# Patient Record
Sex: FEMALE | Race: WHITE
[De-identification: names, ages, dates, MRNs, and addresses within clinical notes are randomized per-mention and may not be internally consistent; named-entity substitution may affect disease eponyms.]

---

## 2022-07-27 ENCOUNTER — HOSPITAL ENCOUNTER (EMERGENCY)
Dept: HOSPITAL 47 - EC | Age: 27
Discharge: HOME | End: 2022-07-27
Payer: COMMERCIAL

## 2022-07-27 VITALS
TEMPERATURE: 98.5 F | SYSTOLIC BLOOD PRESSURE: 118 MMHG | DIASTOLIC BLOOD PRESSURE: 76 MMHG | RESPIRATION RATE: 16 BRPM | HEART RATE: 91 BPM

## 2022-07-27 DIAGNOSIS — O22.43: Primary | ICD-10-CM

## 2022-07-27 DIAGNOSIS — Z3A.32: ICD-10-CM

## 2022-07-27 DIAGNOSIS — F32.A: ICD-10-CM

## 2022-07-27 DIAGNOSIS — Z88.8: ICD-10-CM

## 2022-07-27 DIAGNOSIS — F41.9: ICD-10-CM

## 2022-07-27 PROCEDURE — 99282 EMERGENCY DEPT VISIT SF MDM: CPT

## 2022-07-27 NOTE — ED
Recheck HPI





- General


Chief Complaint: Recheck/Abnormal Lab/Rx


Stated Complaint: hemorrhoid


Time Seen by Provider: 22 10:40


Source: patient


Mode of arrival: ambulatory


Limitations: no limitations





- History of Present Illness


Initial Comments: 


Patient is a 26-year-old female currently 32 weeks pregnant presenting with 

chief complaint hemorrhoid pain.  Patient states that she has had recurrent 

issues with hemorrhoids, however normally pain goes away after a day.  Patient 

states that she has been in severe pain for the last 2 days.  She tried Prep

aration H cream which seemed to make the pain worse.  Patient is able to reduce 

the hemorrhoids, but states that after a few minutes it is protruding and 

causing pain.  She denies any pelvic pain, vaginal bleeding, melena, 

hematochezia, diarrhea, abdominal pain.








- Related Data


                                    Allergies











Allergy/AdvReac Type Severity Reaction Status Date / Time


 


metoclopramide [From Reglan] AdvReac  Unknown Verified 22 10:33














Review of Systems


ROS Statement: 


Those systems with pertinent positive or pertinent negative responses have been 

documented in the HPI.





ROS Other: All systems not noted in ROS Statement are negative.





Past Medical History


Past Medical History: No Reported History


History of Any Multi-Drug Resistant Organisms: None Reported


Past Surgical History:  Section


Additional Past Surgical History / Comment(s): Left nephrectomy


Past Psychological History: Anxiety, Depression


Smoking Status: Never smoker


Past Alcohol Use History: None Reported


Past Drug Use History: None Reported





General Exam


Limitations: no limitations


General appearance: alert, in no apparent distress


Head exam: Present: atraumatic, normocephalic, normal inspection


Eye exam: Present: normal appearance, EOMI.  Absent: scleral icterus, 

periorbital swelling


Neck exam: Present: normal inspection


Rectal exam: Present: hemorrhoids


Neurological exam: Present: alert, oriented X3, CN II-XII intact


Psychiatric exam: Present: normal affect, normal mood


Skin exam: Present: warm, dry, intact, normal color.  Absent: rash





Course


                                   Vital Signs











  22





  10:33


 


Temperature 98.5 F


 


Pulse Rate 91


 


Respiratory 16





Rate 


 


Blood Pressure 118/76


 


O2 Sat by Pulse 98





Oximetry 














Medical Decision Making





- Medical Decision Making


Patient is a 26-year-old female currently 32 weeks pregnant presenting with 

chief complaint hemorrhoid pain. Patient states that she has had this hemorrhoid

for quite some time, states that the pain is much worse than usual.  On 

examination, there is a single hemorrhoid that is reducible, hemorrhoid is not 

thrombosed.  Patient is encouraged to take Metamucil fiber supplement.  I 

explained to the patient that since the hemorrhoid is not thrombosed, we cannot 

drain at this time as it will cause excessive bleeding.  Explained to patient 

that there are steroid suppositories available, however since there is mixed da

ta on their safety in pregnancy she should defer to her OB/GYN for approval 

taking these.  I encouraged her to use supportive treatment with sitz baths and 

hemorrhoid cream.  I provided her with a general surgeon should she wish to have

the hemorrhoid removed.  Follow-up with OB/GYN.  Report back to ER if any new or

worsening symptoms.  Discussed return parameters answered all questions.  

Patient conveyed verbal understanding and agreed to the plan.  I discussed this 

case with my attending Dr. Andrade.








Disposition


Clinical Impression: 


 Hemorrhoid





Disposition: HOME SELF-CARE


Condition: Good


Instructions (If sedation given, give patient instructions):  Hemorrhoids (ED), 

Sitz Bath (DC)


Additional Instructions: 


Follow-up with OB/GYN.  Report back to ER with any new or worsening symptoms.  

Sitz baths may help with symptoms.  An over-the-counter fiber supplement such as

Metamucil can help reduce symptoms and prevent worsening of hemorrhoid or 

development of new hemorrhoids. Stay well-hydrated.  Follow-up with general 

surgery if he wished to have this hemorrhoid removed.


Is patient prescribed a controlled substance at d/c from ED?: No


Referrals: 


Garret Perdomo MD [Primary Care Provider] - 1-2 days


Barbara Lugo MD [STAFF PHYSICIAN] - 1-2 days


Time of Disposition: 11:08

## 2024-10-28 ENCOUNTER — HOSPITAL ENCOUNTER (OUTPATIENT)
Dept: HOSPITAL 47 - RADMAMWWP | Age: 29
Discharge: HOME | End: 2024-10-28
Attending: OBSTETRICS & GYNECOLOGY
Payer: COMMERCIAL

## 2024-10-28 PROCEDURE — 77066 DX MAMMO INCL CAD BI: CPT

## 2024-10-28 PROCEDURE — 77062 BREAST TOMOSYNTHESIS BI: CPT

## 2024-10-28 NOTE — MM
Reason for Exam: Clinical finding. 

Baseline mammogram.





Patient History: 

Menarche at age 10. First Full-Term Pregnancy at age 23. Patient has history of breast feeding.

Patient used Hormonal Contraceptives for 5 years. 04/2018, Excisional Biopsy on the Left side. 





Prior Study Comparison: 

Patient's first Mammogram. 





Tissue Density: 

The breasts are heterogeneously dense, which may obscure small masses.





Findings: 

Analyzed By CAD. 

8 mm nodular asymmetry lateral left breast middle depth. Otherwise, no significant mass, suspicious

microcalcification, or discrete abnormality seen. 





Overall Assessment: Incomplete: need additional imaging evaluation, BI-RAD 0





Management: 

Diagnostic Breast Ultrasound of both breasts.

As order and for the left-sided nodularity



[facility], Workstation: [id number], 10/28/2024 1:56 PM.



Electronically signed and approved by: Sonya Gamez M.D. Radiologist

## 2024-10-28 NOTE — USB
Reason for Exam: Initial imaging for young patient. 





Patient History: 

Menarche at age 10. First Full-Term Pregnancy at age 23. Patient has history of breast feeding.

Patient used Hormonal Contraceptives for 5 years. 04/2018, Excisional Biopsy on the Left side. 

Technique: 

Method: Whole Breast Handheld.  





Findings: 

The whole breast of both breasts, the axilla of both breasts and the retroareolar of both breasts

were scanned.

A complete US of all four quadrants of both breasts, and retro-areolar region were reviewed.



On the right, no solid or cystic lesion in the axillary lymphadenopathy.



On the left:

At the 2:00 position, 6 cm from the nipple, there is a benign appearing cyst cluster measuring 1.3 x

0.5 x 1.3 cm. Possibly corresponding to the mammographic nodule.

At the 3:00 position, 6 cm from the nipple, there is a lobulated hypoechoic lesion which is

indeterminate. This measures 6 x 3 x 5 mm. Possible tiny fibroadenoma. Other more worrisome etiology

not excluded at this time. The patient reports as being in the region of previous fibroadenoma

removal. For this reason, decision was made for a close surveillance follow-up in 3 months to

reassess. If any changes at that time, biopsy can be performed.

Mild subareolar duct ectasia.

No other solid or cystic lesion or axillary lymphadenopathy. 





Overall Assessment: Probably benign, BI-RAD 3





Management: 

Diagnostic Mammogram of the left breast in 3 months.

Diagnostic Breast Ultrasound of the left breast in 3 months.

A clinical breast exam by your physician is recommended on an annual basis and results should be

correlated with mammographic findings.  This exam should not preclude additional follow-up of

suspicious palpable abnormalities.  Results were given to the patient verbally at the time of exam.





[facility], Workstation: [id number], 10/28/2024 2:17 PM.



Electronically signed and approved by: Sonya Gamez M.D. Radiologist

## 2025-01-31 ENCOUNTER — HOSPITAL ENCOUNTER (OUTPATIENT)
Dept: HOSPITAL 47 - RADMAMWWP | Age: 30
Discharge: HOME | End: 2025-01-31
Attending: OBSTETRICS & GYNECOLOGY
Payer: COMMERCIAL

## 2025-01-31 DIAGNOSIS — R92.332: ICD-10-CM

## 2025-01-31 DIAGNOSIS — N63.0: ICD-10-CM

## 2025-01-31 DIAGNOSIS — D24.9: Primary | ICD-10-CM

## 2025-01-31 PROCEDURE — 77061 BREAST TOMOSYNTHESIS UNI: CPT

## 2025-01-31 PROCEDURE — 77065 DX MAMMO INCL CAD UNI: CPT

## 2025-01-31 NOTE — USB
Reason for Exam: Follow-up at short interval from prior study. 





Patient History: 

Menarche at age 10. First Full-Term Pregnancy at age 23. Patient has history of breast feeding.

Patient used Hormonal Contraceptives for 5 years. 04/2018, Excisional Biopsy on the Left side. 

Technique: 

Method: Targeted.  





Prior Study Comparison: 

10/28/2024 Bilateral MG 3D diag mammo w/cad [facility], [facility]. 





Findings: 

The lower outer quadrant of the left breast, the axilla of the left breast and the retroareolar of

the left breast were scanned.

Technique utilized:US breast limited LT Image; Ultrasound imaging of:  Area of concern, retroareolar

region and axilla.

Stable appearance of hypoechoic lesion at 2:00 6 cm from nipple measuring 13 x 5 mm which is smaller

than prior.



Lesion more superficial at 3:00 6 cm from nipple measuring 7 x 3 mm previously 6 x 3 mm is felt to

be similar given differences in technique. This is hypoechoic with well-defined margins.





No evidence for new organizing fluid collection or mass. 





Overall Assessment: Probably benign, BI-RAD 3





Management: 

Diagnostic Breast Ultrasound of the left breast in 6 months.

A clinical breast exam by your physician is recommended on an annual basis and results should be

correlated with mammographic findings.  This exam should not preclude additional follow-up of

suspicious palpable abnormalities.  Results were given to the patient verbally at the time of exam.







[facility], Workstation: [id number], 1/31/2025 1:55 PM.



Electronically signed and approved by: Phuc Lucas DO

## 2025-01-31 NOTE — MM
Reason for Exam: Follow-up at short interval from prior study. 

Last screening mammogram was performed 3 month(s) ago.





Patient History: 

Menarche at age 10. First Full-Term Pregnancy at age 23. Patient has history of breast feeding.

Patient used Hormonal Contraceptives for 5 years. 04/2018, Excisional Biopsy on the Left side. 





Prior Study Comparison: 

10/28/2024 Bilateral MG 3D diag mammo w/cad [facility], [facility]. 





Tissue Density: 

Left: The breasts are heterogeneously dense, which may obscure small masses.





Findings: 

Analyzed By CAD. 

Stable appearance of the area of palpable abnormality.

No new suspicious masses, calcifications or distortions. 





Overall Assessment: Incomplete: need additional imaging evaluation, BI-RAD 0





Management: 

Diagnostic Breast Ultrasound of the left breast.

Results were given to the patient verbally at the time of exam.



Patient should continue monthly self-breast exams.  A clinical breast exam by your physician is

recommended on an annual basis.

This exam should not preclude additional follow-up of suspicious palpable abnormalities.





Note on Didi scores and lifetime risk:

1. A Didi score greater than 3% is considered moderate risk. If this is the case, consider

specialist referral to assess eligibility for a risk reducing agent.

2. If overall lifetime risk for the development of breast cancer is 20% or higher, the patient may

qualify for future screening with alternating mammogram and breast MRI.



[facility], Workstation: [id number], 1/31/2025 1:33 PM.



Electronically signed and approved by: Phuc Lucas DO